# Patient Record
Sex: MALE | Race: BLACK OR AFRICAN AMERICAN | HISPANIC OR LATINO | Employment: OTHER | URBAN - METROPOLITAN AREA
[De-identification: names, ages, dates, MRNs, and addresses within clinical notes are randomized per-mention and may not be internally consistent; named-entity substitution may affect disease eponyms.]

---

## 2019-06-10 ENCOUNTER — TRANSFERRED RECORDS (OUTPATIENT)
Dept: HEALTH INFORMATION MANAGEMENT | Facility: CLINIC | Age: 44
End: 2019-06-10

## 2019-06-14 LAB — EJECTION FRACTION: 25 %

## 2019-08-21 ENCOUNTER — TRANSFERRED RECORDS (OUTPATIENT)
Dept: HEALTH INFORMATION MANAGEMENT | Facility: CLINIC | Age: 44
End: 2019-08-21

## 2019-09-09 ENCOUNTER — TRANSFERRED RECORDS (OUTPATIENT)
Dept: HEALTH INFORMATION MANAGEMENT | Facility: CLINIC | Age: 44
End: 2019-09-09

## 2019-10-23 ENCOUNTER — TRANSFERRED RECORDS (OUTPATIENT)
Dept: HEALTH INFORMATION MANAGEMENT | Facility: CLINIC | Age: 44
End: 2019-10-23

## 2020-06-26 LAB
ALT SERPL-CCNC: 20 U/L (ref 3–40)
AST SERPL-CCNC: 14 U/L (ref 4–35)
CHOLESTEROL (EXTERNAL): 131 MG/DL (ref 130–200)
CREATININE (EXTERNAL): 11.2 MG/DL (ref 0.7–1.3)
GFR ESTIMATED (EXTERNAL): 5 ML/MIN/1.73M2
GFR ESTIMATED (IF AFRICAN AMERICAN) (EXTERNAL): 6 ML/MIN/1.73M2
GLUCOSE (EXTERNAL): 106 MG/DL (ref 75–110)
HBA1C MFR BLD: 5.1 %
HDLC SERPL-MCNC: 38 MG/DL (ref 35–85)
LDL CHOLESTEROL CALCULATED (EXTERNAL): 69 MG/DL
POTASSIUM (EXTERNAL): 4.8 MMOL/L (ref 3.5–5.2)
TRIGLYCERIDES (EXTERNAL): 120 MG/DL
TSH SERPL-ACNC: 0.78 MIU/L (ref 0.5–5.2)

## 2020-08-12 ENCOUNTER — TRANSFERRED RECORDS (OUTPATIENT)
Dept: HEALTH INFORMATION MANAGEMENT | Facility: CLINIC | Age: 45
End: 2020-08-12

## 2020-08-14 ENCOUNTER — TRANSFERRED RECORDS (OUTPATIENT)
Dept: HEALTH INFORMATION MANAGEMENT | Facility: CLINIC | Age: 45
End: 2020-08-14

## 2021-04-02 ENCOUNTER — TRANSFERRED RECORDS (OUTPATIENT)
Dept: HEALTH INFORMATION MANAGEMENT | Facility: CLINIC | Age: 46
End: 2021-04-02

## 2021-04-22 ENCOUNTER — TRANSFERRED RECORDS (OUTPATIENT)
Dept: HEALTH INFORMATION MANAGEMENT | Facility: CLINIC | Age: 46
End: 2021-04-22

## 2021-08-02 ENCOUNTER — TRANSFERRED RECORDS (OUTPATIENT)
Dept: HEALTH INFORMATION MANAGEMENT | Facility: CLINIC | Age: 46
End: 2021-08-02

## 2021-08-02 LAB — EJECTION FRACTION: 65 %

## 2022-01-07 ENCOUNTER — TRANSFERRED RECORDS (OUTPATIENT)
Dept: HEALTH INFORMATION MANAGEMENT | Facility: CLINIC | Age: 47
End: 2022-01-07

## 2022-01-19 ENCOUNTER — TRANSFERRED RECORDS (OUTPATIENT)
Dept: HEALTH INFORMATION MANAGEMENT | Facility: CLINIC | Age: 47
End: 2022-01-19

## 2022-06-01 ENCOUNTER — REFERRAL (OUTPATIENT)
Dept: TRANSPLANT | Facility: CLINIC | Age: 47
End: 2022-06-01

## 2022-06-01 VITALS — WEIGHT: 260 LBS | HEIGHT: 74 IN | BODY MASS INDEX: 33.37 KG/M2

## 2022-06-01 DIAGNOSIS — N18.6 ESRD (END STAGE RENAL DISEASE) (H): Primary | ICD-10-CM

## 2022-06-01 DIAGNOSIS — I10 HYPERTENSION: ICD-10-CM

## 2022-06-01 NOTE — LETTER
Salvatore Langston  [address] [facility] [address]           Miguelina 3, 2022                                        MEDICAL RECORDS REQUEST       [facility] Kidney transplant team is requesting medical records from Providers office for patients referred to Kidney Transplant Program                Facility: [facility]    Records Needed to Process Intake of Patient:       From Primary Care Provider- most recent History and Physical, also most recent provider note     From Cardiology - initial visit note and most recent provider note    Chest CT Report (most recent)    Chest X-Ray Report (most recent)    Colonoscopy Results with Pathology    Coronary Angiogram report    DEXA Scan Results (most recent on file)    ECHO Results (most recent on file)    Hospital Discharge Summaries (last 2 years on file)    Immunization Records (most recent on file)    Lab Results (most recent on file)    Radiology Reports not including Chest X-rays  (last 2 years on file)    PSA Lab Results (most recent on file)      Please fax all paper records to [phone] within 3-5 business days.      [facility]  Solid Organ Transplant Office  [address] [place] [address], [address] [address]  Please call our office at [phone] if you have any questions or concerns.

## 2022-06-01 NOTE — TELEPHONE ENCOUNTER
PCP: Laura Perkins MD- [address],    Referring Provider: self referred  Referring Diagnosis: ESRD, Hx: HTN    Patient is actively listed x approx. 2 yrs @ [facility]    Is patient under the age of 65? yes  Is patient diabetic? no  Is patient on insulin? no  Was patient offered a pancreas transplant referral? no    Is patient in a group home/assisted living? no  Does patient have a guardian? no    Referral intake process completed.  Patient is aware that after financial approval is received, medical records will be requested.   Patient confirmed for a callback from transplant coordinator on June 14, 2022. (within 2 weeks)  Tentative evaluation date not scheduled, wants to discuss first-lives in [place] (within 4 weeks) if appointment is virtual, does patient have capabilities of setting this up?     Confirmed coordinator will discuss evaluation process in more detail at the time of their call.   Patient is aware of the need to arrange age appropriate cancer screening, vaccinations, and dental care.  Reminded patient to complete questionnaire, complete medical records release, and review packet prior to evaluation visit .  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     Patient gave verbal consent during intake call to obtain medical records and documents outside of [facility]:  yes

## 2022-06-01 NOTE — LETTER
[facility]  [address] [facility] [address]            Miguelina 3, 2022    To: [facility]- MAGDA     Fax: [phone]  Re:  Salvatore Langston     1975      Dear: MAGDA    Your patient, was referred and is being evaluated as a possible solid organ transplant candidate at [facility]. In order to make the best possible recommendations for this patient, we require the following records ASAP:    Kidney transplant evaluation records including all imaging reports and lab results  All requested information needs to arrive at our facility by 22      Please fax all reports and paper records to [phone].      If you have any questions or problems, call our office at [phone].      [facility]  Solid Organ Transplant Office  Attn:    [address] [facility]  [address], [facility] [address]  [place] [address]    Sincerely,      Solid Organ Transplant Admin

## 2022-06-01 NOTE — LETTER
Salvat[facility]ston  [address] [facility] [address]                Miguelina 3, 2022                                                                                      MEDICAL RECORDS REQUEST    [facility] Records Request                      Facility: [facility]    Thank you for referring your patient to the [facility]creas [facility], in order to process the referral we will need the following information;    1. Demographics  2. 2728 form   3. Immunizations records  4. Providers Progress notes, (last 2 notes)      Please call our office at [phone] if you have any questions or concerns.                Please fax all paper records to [phone] within 1-3 business days.      Thank you,   The [facility] Referral Intake Team     [facility]  Solid Organ Transplant Office  [address] [place] [address], [address] [address]